# Patient Record
Sex: FEMALE | Race: WHITE | HISPANIC OR LATINO
[De-identification: names, ages, dates, MRNs, and addresses within clinical notes are randomized per-mention and may not be internally consistent; named-entity substitution may affect disease eponyms.]

---

## 2017-01-09 ENCOUNTER — FORM ENCOUNTER (OUTPATIENT)
Age: 67
End: 2017-01-09

## 2017-01-10 ENCOUNTER — OUTPATIENT (OUTPATIENT)
Dept: OUTPATIENT SERVICES | Facility: HOSPITAL | Age: 67
LOS: 1 days | End: 2017-01-10
Payer: COMMERCIAL

## 2017-01-10 PROCEDURE — 77081 DXA BONE DENSITY APPENDICULR: CPT

## 2017-01-10 PROCEDURE — 77080 DXA BONE DENSITY AXIAL: CPT | Mod: 26,59

## 2017-01-10 PROCEDURE — 77080 DXA BONE DENSITY AXIAL: CPT

## 2017-01-10 PROCEDURE — 77081 DXA BONE DENSITY APPENDICULR: CPT | Mod: 26

## 2017-01-31 ENCOUNTER — APPOINTMENT (OUTPATIENT)
Dept: ORTHOPEDIC SURGERY | Facility: CLINIC | Age: 67
End: 2017-01-31

## 2017-01-31 VITALS
SYSTOLIC BLOOD PRESSURE: 140 MMHG | DIASTOLIC BLOOD PRESSURE: 76 MMHG | BODY MASS INDEX: 25.98 KG/M2 | WEIGHT: 143 LBS | HEIGHT: 62.2 IN | HEART RATE: 70 BPM | OXYGEN SATURATION: 98 %

## 2017-01-31 DIAGNOSIS — M85.80 OTHER SPECIFIED DISORDERS OF BONE DENSITY AND STRUCTURE, UNSPECIFIED SITE: ICD-10-CM

## 2017-01-31 RX ORDER — RALOXIFENE HYDROCHLORIDE 60 MG/1
60 TABLET, FILM COATED ORAL DAILY
Qty: 30 | Refills: 11 | Status: ACTIVE | COMMUNITY
Start: 2017-01-31 | End: 1900-01-01

## 2017-06-13 ENCOUNTER — APPOINTMENT (OUTPATIENT)
Dept: ORTHOPEDIC SURGERY | Facility: CLINIC | Age: 67
End: 2017-06-13

## 2017-06-27 ENCOUNTER — FORM ENCOUNTER (OUTPATIENT)
Age: 67
End: 2017-06-27

## 2017-06-28 ENCOUNTER — APPOINTMENT (OUTPATIENT)
Dept: ORTHOPEDIC SURGERY | Facility: CLINIC | Age: 67
End: 2017-06-28

## 2017-06-28 ENCOUNTER — OUTPATIENT (OUTPATIENT)
Dept: OUTPATIENT SERVICES | Facility: HOSPITAL | Age: 67
LOS: 1 days | End: 2017-06-28
Payer: COMMERCIAL

## 2017-06-28 DIAGNOSIS — S22.000D WEDGE COMPRESSION FRACTURE OF UNSPECIFIED THORACIC VERTEBRA, SUBSEQUENT ENCOUNTER FOR FRACTURE WITH ROUTINE HEALING: ICD-10-CM

## 2017-06-28 PROCEDURE — 72082 X-RAY EXAM ENTIRE SPI 2/3 VW: CPT | Mod: 26

## 2017-06-28 PROCEDURE — 72082 X-RAY EXAM ENTIRE SPI 2/3 VW: CPT

## 2017-08-08 ENCOUNTER — APPOINTMENT (OUTPATIENT)
Dept: ORTHOPEDIC SURGERY | Facility: CLINIC | Age: 67
End: 2017-08-08

## 2019-01-18 ENCOUNTER — APPOINTMENT (OUTPATIENT)
Dept: ORTHOPEDIC SURGERY | Facility: CLINIC | Age: 69
End: 2019-01-18
Payer: MEDICAID

## 2019-01-18 VITALS
OXYGEN SATURATION: 97 % | BODY MASS INDEX: 26.35 KG/M2 | HEART RATE: 76 BPM | WEIGHT: 145 LBS | HEIGHT: 62.2 IN | SYSTOLIC BLOOD PRESSURE: 139 MMHG | DIASTOLIC BLOOD PRESSURE: 74 MMHG

## 2019-01-18 DIAGNOSIS — M75.01 ADHESIVE CAPSULITIS OF RIGHT SHOULDER: ICD-10-CM

## 2019-01-18 PROCEDURE — 73030 X-RAY EXAM OF SHOULDER: CPT | Mod: RT

## 2019-01-18 PROCEDURE — 99214 OFFICE O/P EST MOD 30 MIN: CPT

## 2019-01-18 NOTE — PHYSICAL EXAM
[de-identified] : On inspection of bilateral shoulders there is no atrophy, no deformity, no skin lesions, no ecchymosis.\par \par Right shoulder:\par No swelling\par No tenderness to palpation over the clavicle, a.c. joint, acromion, coracoid.\par \par Passive/Active range of motion forward flexion 130°, abduction 110°, external rotation at the side 35°, internal rotation buttocks.\par Terminal ROM is with pain\par \par \par FE strength is intact 5-/5\par Ext rotation strength is 5/5\par Negative drop arm test\par No external rotation lag signs. \par \par Special tests: equivocal Neers, equivocal Garcias', negative belly press.\par \par Instability test deferred\par \par \par \par \par Left shoulder:\par \par No tenderness to palpation over the clavicle, a.c. joint, acromion, coracoid.\par \par Passive/Active range of motion forward flexion 170°, abduction 170°, external rotation at the side 55°, internal rotation T8.\par \par \par \par FE strength 5/5\par , infraspinatus strength, subscapularis, teres minor 5 out of 5.\par \par No external rotation lag signs. \par \par Special tests: Negative Speeds, negative Yergason's, negative Chiloquin's, negative Neers, negative Garcias', negative liftoff, negative belly press.\par \par Instability test deferred. [de-identified] : X-ray right shoulder from January 18, 2019 4 views demonstrates no acute fracture dislocation or loose body.

## 2019-01-18 NOTE — HISTORY OF PRESENT ILLNESS
[de-identified] : Patient is a 68-year-old female, right-hand dominant, presenting for initial evaluation of right shoulder pain.\par \par Patient describes pain from September of 2018. No acute trauma. Although patient does describe lifting a child with her arm feeling a pull of the right shoulder. Pain is anterolateral. Pain radiates over the shoulder and upper neck. It is worse with movement. Patient does feel stiff.\par \par She does have pain at night.\par \par Patient did finish a few sessions of therapy although without a diagnosis. She denies any numbness or tingling.

## 2019-01-18 NOTE — DISCUSSION/SUMMARY
[de-identified] : Patient is a 68-year-old female presenting for an initial evaluation of right shoulder pain.\par \par Patient is diagnosed with right shoulder adhesive capsulitis. This no evidence of full-thickness rotator cuff tear on exam as the patient has good overhead strength. She does have stiffness.\par \par I spoke to the patient and her familly member in great detail regarding the diagnosis and treatment plan.\par \par She will greatly benefit from structured course of physical therapy and daily stretching. Importance of daily stretching after warming up was discussed.\par \par She will avoid aggravating activities. \par The patient is notified that I will be leaving the practice effective February 15 2019. The patient is given multiple options for followup. Patient is aware that they can call the insurance company if needed for a doctor.